# Patient Record
Sex: MALE | Race: OTHER | Employment: STUDENT | URBAN - METROPOLITAN AREA
[De-identification: names, ages, dates, MRNs, and addresses within clinical notes are randomized per-mention and may not be internally consistent; named-entity substitution may affect disease eponyms.]

---
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
## 2024-01-09 ENCOUNTER — OFFICE VISIT (OUTPATIENT)
Dept: FAMILY MEDICINE CLINIC | Facility: CLINIC | Age: 19
End: 2024-01-09

## 2024-01-09 VITALS
OXYGEN SATURATION: 97 % | DIASTOLIC BLOOD PRESSURE: 70 MMHG | HEART RATE: 88 BPM | TEMPERATURE: 98 F | HEIGHT: 69.49 IN | SYSTOLIC BLOOD PRESSURE: 110 MMHG | BODY MASS INDEX: 19.75 KG/M2 | WEIGHT: 136.38 LBS | RESPIRATION RATE: 18 BRPM

## 2024-01-09 DIAGNOSIS — Z02.5 ROUTINE SPORTS PHYSICAL EXAM: Primary | ICD-10-CM

## 2024-01-09 PROCEDURE — 99395 PREV VISIT EST AGE 18-39: CPT | Performed by: NURSE PRACTITIONER

## 2024-01-09 NOTE — PROGRESS NOTES
CHIEF COMPLAINT:   No chief complaint on file.       HPI:   Donovan Bolivar is a 18 year old male who presents for a sports physical exam. Patient will be participating in track at [employer].   Patient is a sophomore in college.     Patient is in good health and denies chest pains, shortness of breath, back pains while participating in the above activities.  Has have never been denied sports participation previously.   History of Covid infection:     [] No    [x] Yes       When? December 2020  [] Asymptomatic   [x] Mildly symptomatic (<4d fever > 100.4F, < 1 week of myalgia, chills, and lethargy)  [] Moderately or severely symptomatic      School performance/grades: A and Bs.  Patient 1 previous concussion from a fall.  Patient reports slight trouble sleeping, melatonin is taken if needed  Patient denies feeling anxious, nervous, sad, or depressed  Denies problems during sports participation in the past  Denies the use of tobacco, alcohol or street drugs  Sexual history:  yes   No concerns     Pertinent patient and family health history:   Disability from heart disease in a close relative < 50 yrs old: denies    [facility] pre-participation form reviewed and indicates no pertinent patient or family history        Current Outpatient Medications   Medication Sig Dispense Refill    escitalopram 20 MG Oral Tab Take 1 tablet (20 mg total) by mouth daily. (Patient not taking: Reported on 8/21/2023) 30 tablet 0      Past Medical History:   Diagnosis Date    ADHD (attention deficit hyperactivity disorder)     Congenital meatal stenosis     Nocturnal enuresis       Past Surgical History:   Procedure Laterality Date    CREATE EARDRUM OPENING,GEN ANESTH      MIDDLE EAR SURGERY PROC UNLISTED  2005    tube insertion    OTHER SURGICAL HISTORY  3/7/14    meatoplasty dr france       Family History   Problem Relation Age of Onset    Psychiatric Mother         ADD, insomnia      Social History     Socioeconomic History     Marital status: Single   Tobacco Use    Smoking status: Never    Smokeless tobacco: Never   Vaping Use    Vaping Use: Never used   Substance and Sexual Activity    Alcohol use: No     Alcohol/week: 0.0 standard drinks of alcohol    Drug use: No   Other Topics Concern    Caffeine Concern No    Exercise Yes     Comment: daily    Seat Belt Yes        REVIEW OF SYSTEMS:   GENERAL HEALTH: feels well, no fatigue.  SKIN: denies any unusual skin lesions or rashes. Denies history of MRSA  EYES: no visual complaints or deficits  HEENT: denies nasal congestion, sinus pain or sore throat, or hearing loss   RESPIRATORY: denies shortness of breath, wheezing or cough   CARDIOVASCULAR: denies chest pain or dyspnea on exertion. No palpitations   GI: denies nausea, vomiting, constipation, diarrhea.  GENITAL/: no dysuria, urgency or frequency; no hernias  MUSCULOSKELETAL: no joint complaints upper or lower extremities. Denies previous sports related injury.  NEURO: no sensory or motor complaint.  + history of mild concussion from falling.   PSYCHE: no symptoms of depression or anxiety. Hx of anxiety  HEMATOLOGY: denies hx anemia; denies bruising or excessive bleeding  ALLERGY/IMM.: denies food or seasonal allergies    EXAM:   /70   Pulse 88   Temp 98 °F (36.7 °C)   Resp 18   Ht 5' 9.49\" (1.765 m)   Wt 136 lb 6.4 oz (61.9 kg)   SpO2 97%   BMI 19.86 kg/m²     Constitutional: he is oriented to person, place, and time. he appears well-developed.   Head: Normocephalic and atraumatic.   Eyes: EOM are normal. Pupils are equal, round, and reactive to light. No scleral icterus.   ENT: TM's clear, nose normal, throat without erythema or exudate  Neck: Normal range of motion. No thyromegaly present.   Cardiovascular: Normal rate and regular rhythm.  S1, S2; no S3 or S4.  No murmur heard in lying, standing, or squatting position. No friction rub heard.  PMI does not extend past mid-clavicular line. Simultaneous radial and inguinal  pulses 3+/4 bilaterally.  Pulmonary/Chest: No chest wall deformity. Effort normal and breath sounds normal bilaterally. No wheezes or rales.   Abdomen:  Bowel sounds present X4. Abdomen is soft, non-tender, non-distended.  No HSM.  : No inguinal hernias noted. MA Chaperone in room with exam   Musculoskeletal:  Strength +5/5 bilateral arms and legs.  Back: full painless ROM     Lymphadenopathy: No cervical or supraclavicular adenopathy.   Neuro: Alert and oriented to person, place, and time.  Cranial nerves 2-10 grossly intact. Able to duck walk without difficulty. Able to walk on heels and toes without difficulty.   Skin: Skin is warm. No rash noted. No erythema, pallor or jaundice.   Psychiatric: Normal mood and affect and behavior is normal.  PHQ-4 score is 0.      ASSESSMENT AND PLAN:     Donovan Bolivar is a 18 year old male who presents for a sports physical exam.   11 %ile (Z= -1.22) based on CDC (Boys, 2-20 Years) BMI-for-age based on BMI available as of 1/9/2024.    Patient is cleared for sports without restrictions.  Recommend substance abuse avoidance, tobacco avoidance, and safety issues including seatbelt and helmet use.    Discussed BMI and weight. Nutrition counseling provided.  Form filled out and given to patient. Copy of form sent to be scanned into patient's chart.     Advised to see PCP for annual well child visit if not already done this year.

## 2024-07-15 NOTE — PROGRESS NOTES
"   PT TREATMENT     07/15/24 1049   PT Last Visit   PT Visit Date 07/15/24   Pain Assessment   Pain Assessment Tool 0-10   Pain Score No Pain   Restrictions/Precautions   Other Precautions Fall Risk;Pain  (KRUNAL drain, cholecystosomy drain)   General   Additional Pertinent History Pt is a 89 year-old male who was admitted to the hospital on 7/11/24 abdominal pain, had choly tube placed 7/11.  Pt has a history of Parkinson's disease.   Cognition   Attention Within functional limits   Orientation Level Oriented to person;Oriented to place   Subjective   Subjective \"I am weak\"   Transfers   Sit to Stand 4  Minimal assistance   Additional items Verbal cues  (hand placement, to get center of mass over his KYAW)   Stand to Sit 4  Minimal assistance   Additional items Verbal cues   Toilet transfer 3  Moderate assistance   Additional items Standard toilet  (grab bar)   Ambulation/Elevation   Gait pattern Forward Flexion;Narrow KYAW   Gait Assistance 4  Minimal assist   Assistive Device Rolling walker   Distance 2x50 feet with cues for direction, to keep feet apart   Balance   Static Sitting Fair   Static Standing Fair   Ambulatory Fair -   Activity Tolerance   Activity Tolerance Patient tolerated treatment well;Patient limited by fatigue   Assessment   Prognosis Good   Problem List Decreased strength;Decreased range of motion;Impaired balance;Decreased mobility;Pain   Assessment Pt demonstrates good progress toward all goals as pt is now able to ambulate with a walker with min assist x 50 feet.  Pt's gait is mildly unsteady due to narrow base of support but improves with verbal cues. Pt does require min/mod for transfers but demonstrated improved technique with practice.  Pt is motivated to return home and will continue to benefit from skilled PT.  Recommend level II rehab resource intensity.    The patient's AM-PAC Basic Mobility Inpatient Short Form Raw Score is 13. A Raw score of less than or equal to 16 suggests the patient " Discussed test results with Donovan's mom Ciera by phone,giving her Russ's comments and recommendations. . Ciera verbalized understanding. Mom says Jasmine Lopez has a low grade fever of 100.3,sore throat,tightness in his chest and a HA.  He is isolating in his room and may benefit from discharge to post-acute rehabilitation services. Please also refer to the recommendation of the Physical Therapist for safe discharge planning.         Plan   Treatment/Interventions Functional transfer training;LE strengthening/ROM;Elevations;Therapeutic exercise;Endurance training;Bed mobility;Gait training;Equipment eval/education;Spoke to case management   PT Frequency Other (Comment)  (5x/week)   Discharge Recommendation   Rehab Resource Intensity Level, PT II (Moderate Resource Intensity)   AM-PAC Basic Mobility Inpatient   Turning in Flat Bed Without Bedrails 2   Lying on Back to Sitting on Edge of Flat Bed Without Bedrails 2   Moving Bed to Chair 2   Standing Up From Chair Using Arms 3   Walk in Room 3   Climb 3-5 Stairs With Railing 1   Basic Mobility Inpatient Raw Score 13   Basic Mobility Standardized Score 33.99   [facility] Highest Level Of Mobility   -HLM Goal 4: Move to chair/commode   -HLM Achieved 7: Walk 25 feet or more   End of Consult   Patient Position at End of Consult All needs within reach;Bedside chair;Bed/Chair alarm activated   Licensure   [place] License Number  Shital Butler PT [id number]

## 2025-04-24 ENCOUNTER — OFFICE VISIT (OUTPATIENT)
Dept: FAMILY MEDICINE CLINIC | Facility: CLINIC | Age: 20
End: 2025-04-24
Payer: COMMERCIAL

## 2025-04-24 VITALS
TEMPERATURE: 98 F | BODY MASS INDEX: 20.94 KG/M2 | HEART RATE: 77 BPM | SYSTOLIC BLOOD PRESSURE: 100 MMHG | DIASTOLIC BLOOD PRESSURE: 70 MMHG | WEIGHT: 144.63 LBS | HEIGHT: 69.5 IN | OXYGEN SATURATION: 98 %

## 2025-04-24 DIAGNOSIS — R09.81 SINUS CONGESTION: Primary | ICD-10-CM

## 2025-04-24 DIAGNOSIS — F90.1 ADHD (ATTENTION DEFICIT HYPERACTIVITY DISORDER), PREDOMINANTLY HYPERACTIVE IMPULSIVE TYPE: ICD-10-CM

## 2025-04-24 PROCEDURE — 99214 OFFICE O/P EST MOD 30 MIN: CPT | Performed by: NURSE PRACTITIONER

## 2025-04-24 PROCEDURE — 3074F SYST BP LT 130 MM HG: CPT | Performed by: NURSE PRACTITIONER

## 2025-04-24 PROCEDURE — 3078F DIAST BP <80 MM HG: CPT | Performed by: NURSE PRACTITIONER

## 2025-04-24 PROCEDURE — 3008F BODY MASS INDEX DOCD: CPT | Performed by: NURSE PRACTITIONER

## 2025-04-24 RX ORDER — DEXTROAMPHETAMINE SACCHARATE, AMPHETAMINE ASPARTATE, DEXTROAMPHETAMINE SULFATE AND AMPHETAMINE SULFATE 1.25; 1.25; 1.25; 1.25 MG/1; MG/1; MG/1; MG/1
5 TABLET ORAL DAILY
Qty: 30 TABLET | Refills: 0 | Status: SHIPPED | OUTPATIENT
Start: 2025-05-08 | End: 2025-06-07

## 2025-04-24 RX ORDER — DEXTROAMPHETAMINE SACCHARATE, AMPHETAMINE ASPARTATE MONOHYDRATE, DEXTROAMPHETAMINE SULFATE AND AMPHETAMINE SULFATE 2.5; 2.5; 2.5; 2.5 MG/1; MG/1; MG/1; MG/1
10 CAPSULE, EXTENDED RELEASE ORAL DAILY
Qty: 30 CAPSULE | Refills: 0 | Status: SHIPPED | OUTPATIENT
Start: 2025-05-08 | End: 2025-06-07

## 2025-04-24 RX ORDER — DEXTROAMPHETAMINE SACCHARATE, AMPHETAMINE ASPARTATE MONOHYDRATE, DEXTROAMPHETAMINE SULFATE AND AMPHETAMINE SULFATE 2.5; 2.5; 2.5; 2.5 MG/1; MG/1; MG/1; MG/1
10 CAPSULE, EXTENDED RELEASE ORAL DAILY
Qty: 30 CAPSULE | Refills: 0 | Status: SHIPPED | OUTPATIENT
Start: 2025-08-08 | End: 2025-09-07

## 2025-04-24 RX ORDER — DEXTROAMPHETAMINE SACCHARATE, AMPHETAMINE ASPARTATE, DEXTROAMPHETAMINE SULFATE AND AMPHETAMINE SULFATE 1.25; 1.25; 1.25; 1.25 MG/1; MG/1; MG/1; MG/1
5 TABLET ORAL DAILY
Qty: 30 TABLET | Refills: 0 | Status: SHIPPED | OUTPATIENT
Start: 2025-08-07 | End: 2025-09-06

## 2025-04-24 RX ORDER — DEXTROAMPHETAMINE SACCHARATE, AMPHETAMINE ASPARTATE, DEXTROAMPHETAMINE SULFATE AND AMPHETAMINE SULFATE 1.25; 1.25; 1.25; 1.25 MG/1; MG/1; MG/1; MG/1
5 TABLET ORAL DAILY
Qty: 30 TABLET | Refills: 0 | Status: SHIPPED | OUTPATIENT
Start: 2025-06-07 | End: 2025-07-07

## 2025-04-24 RX ORDER — PREDNISONE 20 MG/1
40 TABLET ORAL DAILY
Qty: 10 TABLET | Refills: 0 | Status: SHIPPED | OUTPATIENT
Start: 2025-04-24

## 2025-04-24 RX ORDER — AZELASTINE 1 MG/ML
1 SPRAY, METERED NASAL 2 TIMES DAILY
Qty: 30 ML | Refills: 0 | Status: SHIPPED | OUTPATIENT
Start: 2025-04-24

## 2025-04-24 RX ORDER — DEXTROAMPHETAMINE SACCHARATE, AMPHETAMINE ASPARTATE MONOHYDRATE, DEXTROAMPHETAMINE SULFATE AND AMPHETAMINE SULFATE 2.5; 2.5; 2.5; 2.5 MG/1; MG/1; MG/1; MG/1
10 CAPSULE, EXTENDED RELEASE ORAL DAILY
Qty: 30 CAPSULE | Refills: 0 | Status: SHIPPED | OUTPATIENT
Start: 2025-06-07 | End: 2025-07-07

## 2025-04-24 NOTE — PROGRESS NOTES
Chief Complaint   Patient presents with    Cold     Headache, dry throat, congestion. Happens every 3 weeks where he gets these symptoms. Only been using ibuprofen and tylenol       HPI:  Presents with approx 4-5 day history of sinus congestion, headaches,mild sore throat, ear pressure and body aches. Also, reports has been having similar symptoms about every 3 weeks for past 4 months, also also, frequently have fevers as well (as high as 101) and occasionally a cough. No fevers or cough with current illness. Did present to  with [facility] for these symptoms on 3/25/25 (notes from visit reviewed by me). At that visit was managed with Doxycycline which helped those symptoms. Denies chills, cough, ear pain, SOB/SHANKAR or fatigue. Has been treating with occasional allergy medications but admits this is not consistent.     Also, for follow up of ADHD, managed with Adderall ER 10mg and Adderall IR 5mg. Reports taking as ordered with good results (only takes Adderall IR 5mg as needed, not daily). Reports better able to focus and concentrate with medications. 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 Reports he is able to complete tasks and projects on time. Denies mood changes, difficulty sleeping, poor appetite, unintended weight changes, chest pain, palpitations. Per IL  review last dispensed on 4/8/25 for both medications.     Past Medical History[1]    Problem List[2]    Current Medications[3]    Physical Exam  /70   Pulse 77   Temp 98.1 °F (36.7 °C)   Ht 5' 9.5\" (1.765 m)   Wt 144 lb 9.6 oz (65.6 kg)   SpO2 98%   BMI 21.05 kg/m²   Constitutional: well developed, well nourished, in no apparent distress  HEENT: Normocephalic and atraumatic. Tympanic membranes clear with bulging bilat, no erythema or air/fluid levels. Oropharynx is erythematous without exudate, lesions or edema. (+)PND. No facial tenderness.  Eyes: Conjunctivae are pink and moist without drainage.   Lymphadenopathy:  No cervical adenopathy.   Cardiovascular: Normal rate, regular rhythm.  No murmur.   Pulmonary/Chest: No respiratory distress. Effort normal. Breath sounds clear bilaterally. No wheezes, rhonchi or rales appreciated. No cough heard during exam.   Skin: Skin is warm and dry. No pallor. No rash noted.  Psych: Sitting calmly in exam room, interacting appropriately with examiner. Dressed appropriately for the weather.     A/P:    Encounter Diagnoses   Name Primary?    Sinus congestion- suspect allergies at this time. Given recurrent presentation, will check labs as ordered. Prednisone burst. Medication administration, use and side effects discussed. Azelastine nasal spray. Instructed to notify office if not improved in 4-5 days or if symptoms worsen, will do extended course Augmentin and refer to ENT for visualization of sinuses. Verbalized understanding of instructions and agreeable to this plan of care.     Yes    ADHD (attention deficit hyperactivity disorder), predominantly hyperactive impulsive type- Stable. Continue current management, refills provided, future dated appropriately. IL- checked prior to prescribing, no suspicious activity noted.           Orders Placed This Encounter   Procedures    CBC With Differential With Platelet    Comp Metabolic Panel (14) [E]    Mono Qual, reflex to EBV on Neg [E]       Meds & Refills for this Visit:  Requested Prescriptions     Pending Prescriptions Disp Refills    amphetamine-dextroamphetamine ER (ADDERALL XR) 10 MG Oral Capsule SR 24 Hr 30 capsule 0     Sig: Take 1 capsule (10 mg total) by mouth daily.    amphetamine-dextroamphetamine ER (ADDERALL XR) 10 MG Oral Capsule SR 24 Hr 30 capsule 0     Sig: Take 1 capsule (10 mg total) by mouth daily.    amphetamine-dextroamphetamine ER (ADDERALL XR) 10 MG Oral Capsule SR 24 Hr 30 capsule 0     Sig: Take 1 capsule (10 mg total) by mouth daily.    amphetamine-dextroamphetamine 5 MG Oral Tab 30 tablet 0     Sig: Take 1 tablet  (5 mg total) by mouth daily.    amphetamine-dextroamphetamine 5 MG Oral Tab 30 tablet 0     Sig: Take 1 tablet (5 mg total) by mouth daily.    amphetamine-dextroamphetamine 5 MG Oral Tab 30 tablet 0     Sig: Take 1 tablet (5 mg total) by mouth daily.     Signed Prescriptions Disp Refills    predniSONE 20 MG Oral Tab 10 tablet 0     Sig: Take 2 tablets (40 mg total) by mouth daily.    azelastine 0.1 % Nasal Solution 30 mL 0     Si spray by Nasal route 2 (two) times daily.       Imaging & Consults:  None    No follow-ups on file.  Patient Instructions         Take prednisone, 2 tabs, at the same time, daily for 5 days. Take early in the morning.   Do not take any Motrin, Advil, ibuprofen products or Aleve while taking this medication.    It is ok to take Tylenol (acetaminophen) while taking this medication. Follow  instructions for dosing and frequency schedule.       Use Azelastine, one spray each nostril, morning and evening for one month.       Please complete blood work as ordered. Do blood work fasting- no food or drink except for plain water- for 8 hours prior to testing. Ideally, labs would be done early in the morning.   You can call [phone] to schedule testing or it can be scheduled via the [employer] kusum.             All questions were answered and the patient understands the plan.            [1]   Past Medical History:   ADHD (attention deficit hyperactivity disorder)    Congenital meatal stenosis    Nocturnal enuresis   [2]   Patient Active Problem List  Diagnosis    ADHD (attention deficit hyperactivity disorder), predominantly hyperactive impulsive type    Urethral stricture unspecified    Nocturnal enuresis    Encounter for medication management    Anxiety   [3]   Current Outpatient Medications   Medication Sig Dispense Refill    predniSONE 20 MG Oral Tab Take 2 tablets (40 mg total) by mouth daily. 10 tablet 0    azelastine 0.1 % Nasal Solution 1 spray by Nasal route 2 (two) times  daily. 30 mL 0    amphetamine-dextroamphetamine (ADDERALL) 5 MG Oral Tab Take 1 tablet (5 mg total) by mouth daily as needed (breakthrough). 30 tablet 0    amphetamine-dextroamphetamine ER (ADDERALL XR) 10 MG Oral Capsule SR 24 Hr Take 1 capsule (10 mg total) by mouth daily. 30 capsule 0

## 2025-04-24 NOTE — PATIENT INSTRUCTIONS
Take prednisone, 2 tabs, at the same time, daily for 5 days. Take early in the morning.   Do not take any Motrin, Advil, ibuprofen products or Aleve while taking this medication.    It is ok to take Tylenol (acetaminophen) while taking this medication. Follow  instructions for dosing and frequency schedule.       Use Azelastine, one spray each nostril, morning and evening for one month.       Please complete blood work as ordered. Do blood work fasting- no food or drink except for plain water- for 8 hours prior to testing. Ideally, labs would be done early in the morning.   You can call [phone] to schedule testing or it can be scheduled via the [employer] kusum.

## 2025-04-26 ENCOUNTER — LAB ENCOUNTER (OUTPATIENT)
Dept: LAB | Age: 20
End: 2025-04-26
Attending: NURSE PRACTITIONER
Payer: COMMERCIAL

## 2025-04-26 DIAGNOSIS — F90.1 ADHD (ATTENTION DEFICIT HYPERACTIVITY DISORDER), PREDOMINANTLY HYPERACTIVE IMPULSIVE TYPE: ICD-10-CM

## 2025-04-26 LAB
ALBUMIN SERPL-MCNC: 4.7 G/DL (ref 3.2–4.8)
ALBUMIN/GLOB SERPL: 1.6 {RATIO} (ref 1–2)
ALP LIVER SERPL-CCNC: 110 U/L (ref 45–117)
ALT SERPL-CCNC: 10 U/L (ref 10–49)
ANION GAP SERPL CALC-SCNC: 8 MMOL/L (ref 0–18)
AST SERPL-CCNC: 18 U/L (ref ?–34)
BASOPHILS # BLD AUTO: 0.03 X10(3) UL (ref 0–0.2)
BASOPHILS NFR BLD AUTO: 0.3 %
BILIRUB SERPL-MCNC: 0.4 MG/DL (ref 0.3–1.2)
BUN BLD-MCNC: 18 MG/DL (ref 9–23)
CALCIUM BLD-MCNC: 10.4 MG/DL (ref 8.7–10.6)
CHLORIDE SERPL-SCNC: 107 MMOL/L (ref 98–112)
CO2 SERPL-SCNC: 27 MMOL/L (ref 21–32)
CREAT BLD-MCNC: 0.97 MG/DL (ref 0.7–1.3)
EGFRCR SERPLBLD CKD-EPI 2021: 114 ML/MIN/1.73M2 (ref 60–?)
EOSINOPHIL # BLD AUTO: 0.08 X10(3) UL (ref 0–0.7)
EOSINOPHIL NFR BLD AUTO: 0.9 %
ERYTHROCYTE [DISTWIDTH] IN BLOOD BY AUTOMATED COUNT: 12.3 %
FASTING STATUS PATIENT QL REPORTED: YES
GLOBULIN PLAS-MCNC: 3 G/DL (ref 2–3.5)
GLUCOSE BLD-MCNC: 100 MG/DL (ref 70–99)
HCT VFR BLD AUTO: 45.6 % (ref 39–53)
HETEROPH AB SER QL: NEGATIVE
HGB BLD-MCNC: 15.3 G/DL (ref 13–17.5)
IMM GRANULOCYTES # BLD AUTO: 0.03 X10(3) UL (ref 0–1)
IMM GRANULOCYTES NFR BLD: 0.3 %
LYMPHOCYTES # BLD AUTO: 2.37 X10(3) UL (ref 1–4)
LYMPHOCYTES NFR BLD AUTO: 26.6 %
MCH RBC QN AUTO: 28.8 PG (ref 26–34)
MCHC RBC AUTO-ENTMCNC: 33.6 G/DL (ref 31–37)
MCV RBC AUTO: 85.9 FL (ref 80–100)
MONOCYTES # BLD AUTO: 0.73 X10(3) UL (ref 0.1–1)
MONOCYTES NFR BLD AUTO: 8.2 %
NEUTROPHILS # BLD AUTO: 5.68 X10 (3) UL (ref 1.5–7.7)
NEUTROPHILS # BLD AUTO: 5.68 X10(3) UL (ref 1.5–7.7)
NEUTROPHILS NFR BLD AUTO: 63.7 %
OSMOLALITY SERPL CALC.SUM OF ELEC: 296 MOSM/KG (ref 275–295)
PLATELET # BLD AUTO: 211 10(3)UL (ref 150–450)
POTASSIUM SERPL-SCNC: 3.7 MMOL/L (ref 3.5–5.1)
PROT SERPL-MCNC: 7.7 G/DL (ref 5.7–8.2)
RBC # BLD AUTO: 5.31 X10(6)UL (ref 4.3–5.7)
SODIUM SERPL-SCNC: 142 MMOL/L (ref 136–145)
WBC # BLD AUTO: 8.9 X10(3) UL (ref 4–11)

## 2025-04-26 PROCEDURE — 80053 COMPREHEN METABOLIC PANEL: CPT | Performed by: NURSE PRACTITIONER

## 2025-04-26 PROCEDURE — 85025 COMPLETE CBC W/AUTO DIFF WBC: CPT | Performed by: NURSE PRACTITIONER

## 2025-04-26 PROCEDURE — 86403 PARTICLE AGGLUT ANTBDY SCRN: CPT | Performed by: NURSE PRACTITIONER

## 2025-04-26 PROCEDURE — 86665 EPSTEIN-BARR CAPSID VCA: CPT | Performed by: NURSE PRACTITIONER

## 2025-04-26 PROCEDURE — 86664 EPSTEIN-BARR NUCLEAR ANTIGEN: CPT | Performed by: NURSE PRACTITIONER

## 2025-04-28 LAB
EBV NA IGG SER QL IA: POSITIVE
EBV VCA IGG SER QL IA: POSITIVE
EBV VCA IGM SER QL IA: NEGATIVE

## 2025-06-12 RX ORDER — AZELASTINE HYDROCHLORIDE 137 UG/1
1 SPRAY, METERED NASAL 2 TIMES DAILY
Qty: 30 ML | Refills: 3 | Status: SHIPPED | OUTPATIENT
Start: 2025-06-12

## 2025-08-13 DIAGNOSIS — F90.1 ADHD (ATTENTION DEFICIT HYPERACTIVITY DISORDER), PREDOMINANTLY HYPERACTIVE IMPULSIVE TYPE: ICD-10-CM

## 2025-08-18 RX ORDER — DEXTROAMPHETAMINE SACCHARATE, AMPHETAMINE ASPARTATE MONOHYDRATE, DEXTROAMPHETAMINE SULFATE AND AMPHETAMINE SULFATE 2.5; 2.5; 2.5; 2.5 MG/1; MG/1; MG/1; MG/1
10 CAPSULE, EXTENDED RELEASE ORAL DAILY
Qty: 30 CAPSULE | Refills: 0 | Status: CANCELLED | OUTPATIENT
Start: 2025-08-18 | End: 2025-09-17

## 2025-08-18 RX ORDER — DEXTROAMPHETAMINE SACCHARATE, AMPHETAMINE ASPARTATE, DEXTROAMPHETAMINE SULFATE AND AMPHETAMINE SULFATE 1.25; 1.25; 1.25; 1.25 MG/1; MG/1; MG/1; MG/1
5 TABLET ORAL DAILY
Qty: 30 TABLET | Refills: 0 | Status: CANCELLED | OUTPATIENT
Start: 2025-08-18 | End: 2025-09-17

## (undated) NOTE — LETTER
Date & Time: 8/21/2023, 2:40 PM  Patient: Shruti Hill  Encounter Provider(s):    JOSE Medina       To Whom It May Concern:    Katarzyna Armstrong was seen and treated in our department on 8/21/2023. He should not return to work until 08/23/23 .     If you have any questions or concerns, please do not hesitate to call.        _____________________________  Physician/APC Signature

## (undated) NOTE — MR AVS SNAPSHOT
[address] [facility] [address]  [facility],  [address] [facility] [phone]               Thank you for choosing us for your health care visit with Chica Iniguez PA-C.   We are glad to serve you and happy to provide you with [facility] shoulder near the ear. Or, a vibrating alarm may be placed under the child’s pillow. · If the child begins to urinate, the alarm goes off. This wakes the child up. He or she can then get up and use the toilet.   · Some children sleep through the alarm at f [address] [address]  ([address] [facility] [address])    [facility],  [address] [place] [address] [phone]              Allergies as of Feb 15, 2017     No Known Allergies                Today's Vital Signs take each. Commonly known as:  ADDERALL XR           * Amphetamine-Dextroamphet ER 10 MG Cp24   Take 1 capsule (10 mg total) by mouth daily. What changed: You were already taking a medication with the same name, and this prescription was added.  Make s Fact Sheet: Healthy Active Living for Families    Healthy nutrition starts as early as infancy with breastfeeding. Once your baby begins eating solid foods, introduce nutritious foods early on and often.  Sometimes toddlers need to try a food 10 times befor

## (undated) NOTE — MR AVS SNAPSHOT
[address] [facility] [address]  [facility],  [address] [facility] [phone]               Thank you for choosing us for your health care visit with Bennie Jimenez PA-C.   We are glad to serve you and happy to provide you with [facility] Commonly known as:  ADDERALL XR           * Amphetamine-Dextroamphet ER 10 MG Cp24   Take 1 capsule (10 mg total) by mouth daily. What changed:  Another medication with the same name was added. Make sure you understand how and when to take each.    Common Where to Get Your Medications      You can get these medications from any pharmacy     Bring a paper prescription for each of these medications    - Amphetamine-Dextroamphet ER 10 MG Cp24  - Amphetamine-Dextroamphet ER 10 MG Cp24  - Amphetamine-Dextroamphe

## (undated) NOTE — LETTER
Name:  Don Contreras Year:  12th Grade Class: Student ID No.:   Address:  [address] [place], [place] [address]  [place] [address] Phone:  [phone] (home)  :  15year old   Name Relationship Lgl [address] 3 Work Phone Home Phone Mobile Phone   1.  [facility] cardiomyopathy, Marfan syndrome, arrhythmogenic right ventricular cardiomyopathy, long QT syndrome, short QT syndrome, Brugada syndrome, or catecholaminergic polymorphic ventricular tachycardia? No   15.  Does anyone in your family have a heart problem, pac blow to the head that caused confusion, prolonged headache, or memory problems? No   36. Do you have a history of seizure disorder? No   37. Do you have headaches with exercise? No   38.  Have you ever had numbness, tingling, or weakness in your arms or leg of 8/5/2021.  male    Vision: R 20/30          L 20/30          BOTH 20/30          Uncorrected   MEDICAL NORMAL ABNORMAL FINDINGS   Appearance:  Marfan stigmata (kyphoscoliosis, high-arched palate, pectus excavatum,      arachnodactyly, arm span > height, 4823-6708 school term    As a prerequisite to participation in [employer] athletic activities, we agree that I/our student will not use performance-enhancing substances as defined in the [facility] Performance-Enhancing Substance Testing Program Protocol.  We have revi

## (undated) NOTE — LETTER
Name:  Beto Lazo Year:  10th Grade Class: Student ID No.:   Address:  [address] [place] [address]  [place] [address] Phone:  [phone] (home)  :  12year old   Name Relationship Lgl [address] 3 Work Phone Home Phone Mobile Phone   1.  [facility] unexpected or unexplained sudden death before age 48? (including drowning, unexplained car accident, or sudden infant death syndrome)? No   14.  Does anyone in your family have hypertrophic cardiomyopathy, Marfan syndrome, arrhythmogenic right ventricular c 32. Do you have any rashes, pressure sores, or other skin problems? No   33. Have you had a herpes or MRSA skin infection? No   34. Have you ever had a head injury or concussion? No   35.  Have you ever had a hit or blow to the head that caused confusion, p Date:8/12/2019                               EXAMINATION   /56 (BP Location: Left arm, Patient Position: Sitting, Cuff Size: adult)   Pulse 73   Temp 97.8 °F (36.6 °C) (Oral)   Resp 16   Ht 67.5\"   Wt 111 lb   SpO2 99%   BMI 17.13 kg/m²  8 %ile (Z= Travis Gil, EVAN   *effective January 2003, the [employer] of Directors approved a recommendation, consistent with the [facility] [employer], that allows [employer] or Advanced Nurse Practitioners to sign off on physicals.    [employer], & [address] [place] [address] [employer]. Permission granted to reprint for noncommercial, educational purposes with acknowledgment.    [id number]

## (undated) NOTE — LETTER
State of [facility] [employer] of Child Health Examination       Student's Name  Tasha Klein Title                           Date     Signature Grade   HEALTH HISTORY          TO BE COMPLETED AND SIGNED BY PARENT/GUARDIAN AND VERIFIED BY HEALTH CARE PROVIDER    ALLERGIES  (Food, drug, insect, other)  Patient has no known allergies.  MEDICATION  (List all prescribed or taken on a regular basis.) MD/DO/APN/PA       PHYSICAL EXAMINATION REQUIREMENTS (head circumference if <33 years old):   BP 96/60   Pulse 74   Temp 98.1 °F (36.7 °C) (Temporal)   Resp 16   Ht 5' 10\" (1.778 m)   Wt 125 lb (56.7 kg)   BMI 17.94 kg/m²     DIABETES SCREENING  BMI>85% Yes    Cardiovascular/HTN Yes  Nutritional status Yes    Respiratory Yes                   Diagnosis of Asthma: No Mental Health Yes        Currently Prescribed Asthma Medication:            Quick-relief  medication (e.g. Short Acting Beta Antagonist):  No

## (undated) NOTE — LETTER
[facility], [address] [facility], [address] [place], [place] [address] [address] [facility] [address]        [facility]  Stimulant Agreement    The purpose of this Agreement is to prevent misunderstandings about certain medi Self-medicate with legal controlled substances not prescribed to me. I promise to let my physician know of any medications prescribed to me by another  provider after  the date of this agreement.  Use of alcohol is contraindicated; if I  drink alcohol, I p Date   Time   Signature of Patient or Authorized Representative (with relationship to the Patient)  The Patient/Authorized Representative has read this form or had it read to him/her, states that he/she understands this information and has no further quest